# Patient Record
Sex: MALE | Race: WHITE | NOT HISPANIC OR LATINO | Employment: FULL TIME | URBAN - NONMETROPOLITAN AREA
[De-identification: names, ages, dates, MRNs, and addresses within clinical notes are randomized per-mention and may not be internally consistent; named-entity substitution may affect disease eponyms.]

---

## 2020-09-26 ENCOUNTER — HOSPITAL ENCOUNTER (EMERGENCY)
Facility: HOSPITAL | Age: 45
Discharge: ANOTHER HOSPITAL | End: 2020-09-26
Attending: EMERGENCY MEDICINE | Admitting: EMERGENCY MEDICINE
Payer: COMMERCIAL

## 2020-09-26 VITALS
SYSTOLIC BLOOD PRESSURE: 104 MMHG | DIASTOLIC BLOOD PRESSURE: 57 MMHG | OXYGEN SATURATION: 98 % | WEIGHT: 165 LBS | HEART RATE: 77 BPM | HEIGHT: 68 IN | RESPIRATION RATE: 14 BRPM | TEMPERATURE: 97.4 F | BODY MASS INDEX: 25.01 KG/M2

## 2020-09-26 DIAGNOSIS — K92.2 UPPER GI BLEED: Primary | ICD-10-CM

## 2020-09-26 LAB
ABO GROUP BLD: NORMAL
ABO GROUP BLD: NORMAL
ALBUMIN SERPL BCP-MCNC: 3.4 G/DL (ref 3.5–5)
ALP SERPL-CCNC: 75 U/L (ref 46–116)
ALT SERPL W P-5'-P-CCNC: 37 U/L (ref 12–78)
ANION GAP SERPL CALCULATED.3IONS-SCNC: 5 MMOL/L (ref 4–13)
AST SERPL W P-5'-P-CCNC: 13 U/L (ref 5–45)
BASOPHILS # BLD AUTO: 0.04 THOUSANDS/ΜL (ref 0–0.1)
BASOPHILS NFR BLD AUTO: 0 % (ref 0–1)
BILIRUB SERPL-MCNC: 0.6 MG/DL (ref 0.2–1)
BLD GP AB SCN SERPL QL: POSITIVE
BUN SERPL-MCNC: 41 MG/DL (ref 5–25)
CALCIUM ALBUM COR SERPL-MCNC: 9 MG/DL (ref 8.3–10.1)
CALCIUM SERPL-MCNC: 8.5 MG/DL (ref 8.3–10.1)
CHLORIDE SERPL-SCNC: 103 MMOL/L (ref 100–108)
CO2 SERPL-SCNC: 30 MMOL/L (ref 21–32)
CREAT SERPL-MCNC: 0.86 MG/DL (ref 0.6–1.3)
EOSINOPHIL # BLD AUTO: 0.04 THOUSAND/ΜL (ref 0–0.61)
EOSINOPHIL NFR BLD AUTO: 0 % (ref 0–6)
ERYTHROCYTE [DISTWIDTH] IN BLOOD BY AUTOMATED COUNT: 12.8 % (ref 11.6–15.1)
GFR SERPL CREATININE-BSD FRML MDRD: 105 ML/MIN/1.73SQ M
GLUCOSE SERPL-MCNC: 190 MG/DL (ref 65–140)
HCT VFR BLD AUTO: 36.1 % (ref 36.5–49.3)
HGB BLD-MCNC: 12.1 G/DL (ref 12–17)
IMM GRANULOCYTES # BLD AUTO: 0.09 THOUSAND/UL (ref 0–0.2)
IMM GRANULOCYTES NFR BLD AUTO: 1 % (ref 0–2)
LIPASE SERPL-CCNC: 95 U/L (ref 73–393)
LYMPHOCYTES # BLD AUTO: 2.23 THOUSANDS/ΜL (ref 0.6–4.47)
LYMPHOCYTES NFR BLD AUTO: 18 % (ref 14–44)
MCH RBC QN AUTO: 30.8 PG (ref 26.8–34.3)
MCHC RBC AUTO-ENTMCNC: 33.5 G/DL (ref 31.4–37.4)
MCV RBC AUTO: 92 FL (ref 82–98)
MONOCYTES # BLD AUTO: 0.79 THOUSAND/ΜL (ref 0.17–1.22)
MONOCYTES NFR BLD AUTO: 6 % (ref 4–12)
NEUTROPHILS # BLD AUTO: 9.58 THOUSANDS/ΜL (ref 1.85–7.62)
NEUTS SEG NFR BLD AUTO: 75 % (ref 43–75)
NRBC BLD AUTO-RTO: 0 /100 WBCS
PLATELET # BLD AUTO: 320 THOUSANDS/UL (ref 149–390)
PMV BLD AUTO: 10.7 FL (ref 8.9–12.7)
POTASSIUM SERPL-SCNC: 4.2 MMOL/L (ref 3.5–5.3)
PROT SERPL-MCNC: 6.7 G/DL (ref 6.4–8.2)
RBC # BLD AUTO: 3.93 MILLION/UL (ref 3.88–5.62)
RH BLD: POSITIVE
RH BLD: POSITIVE
SODIUM SERPL-SCNC: 138 MMOL/L (ref 136–145)
SPECIMEN EXPIRATION DATE: NORMAL
TROPONIN I SERPL-MCNC: <0.02 NG/ML
WBC # BLD AUTO: 12.77 THOUSAND/UL (ref 4.31–10.16)

## 2020-09-26 PROCEDURE — 84484 ASSAY OF TROPONIN QUANT: CPT | Performed by: EMERGENCY MEDICINE

## 2020-09-26 PROCEDURE — 80053 COMPREHEN METABOLIC PANEL: CPT | Performed by: EMERGENCY MEDICINE

## 2020-09-26 PROCEDURE — 83690 ASSAY OF LIPASE: CPT | Performed by: EMERGENCY MEDICINE

## 2020-09-26 PROCEDURE — 86901 BLOOD TYPING SEROLOGIC RH(D): CPT | Performed by: EMERGENCY MEDICINE

## 2020-09-26 PROCEDURE — 86905 BLOOD TYPING RBC ANTIGENS: CPT

## 2020-09-26 PROCEDURE — 96365 THER/PROPH/DIAG IV INF INIT: CPT

## 2020-09-26 PROCEDURE — 96366 THER/PROPH/DIAG IV INF ADDON: CPT

## 2020-09-26 PROCEDURE — 85025 COMPLETE CBC W/AUTO DIFF WBC: CPT | Performed by: EMERGENCY MEDICINE

## 2020-09-26 PROCEDURE — 86870 RBC ANTIBODY IDENTIFICATION: CPT | Performed by: EMERGENCY MEDICINE

## 2020-09-26 PROCEDURE — 36415 COLL VENOUS BLD VENIPUNCTURE: CPT | Performed by: EMERGENCY MEDICINE

## 2020-09-26 PROCEDURE — 86900 BLOOD TYPING SEROLOGIC ABO: CPT | Performed by: EMERGENCY MEDICINE

## 2020-09-26 PROCEDURE — 86850 RBC ANTIBODY SCREEN: CPT | Performed by: EMERGENCY MEDICINE

## 2020-09-26 PROCEDURE — 99285 EMERGENCY DEPT VISIT HI MDM: CPT | Performed by: EMERGENCY MEDICINE

## 2020-09-26 PROCEDURE — 82272 OCCULT BLD FECES 1-3 TESTS: CPT

## 2020-09-26 PROCEDURE — 99285 EMERGENCY DEPT VISIT HI MDM: CPT

## 2020-09-26 PROCEDURE — C9113 INJ PANTOPRAZOLE SODIUM, VIA: HCPCS | Performed by: EMERGENCY MEDICINE

## 2020-09-26 RX ADMIN — SODIUM CHLORIDE 80 MG: 9 INJECTION, SOLUTION INTRAVENOUS at 07:52

## 2020-09-26 RX ADMIN — SODIUM CHLORIDE 8 MG/HR: 9 INJECTION, SOLUTION INTRAVENOUS at 07:50

## 2020-09-26 RX ADMIN — SODIUM CHLORIDE 1000 ML: 0.9 INJECTION, SOLUTION INTRAVENOUS at 07:50

## 2020-09-26 NOTE — ED PROVIDER NOTES
History  Chief Complaint   Patient presents with    Black or Bloody Stool     dark black stool     Vomiting     pt with blood in toilet this morning from vomiting     Patient complains of GI bleed since last night  He states he had an episode of bright red blood vomiting  He states he has had black stools since that time  He denies abdominal pain  He denies heavy alcohol use  No other complaints  History provided by:  Patient   used: No    Black or Bloody Stool   Quality:  Black and tarry  Amount: Moderate  Duration:  1 day  Timing:  Constant  Chronicity:  New  Context: spontaneously    Context: not constipation and not diarrhea    Similar prior episodes: no    Relieved by:  Nothing  Worsened by:  Nothing  Ineffective treatments:  None tried  Associated symptoms: hematemesis and vomiting    Associated symptoms: no abdominal pain, no dizziness, no epistaxis, no fever, no light-headedness, no loss of consciousness and no recent illness    Risk factors: no anticoagulant use    Vomiting   Associated symptoms: no abdominal pain, no chills, no cough, no diarrhea, no fever, no headaches and no sore throat        None       History reviewed  No pertinent past medical history  History reviewed  No pertinent surgical history  History reviewed  No pertinent family history  I have reviewed and agree with the history as documented  E-Cigarette/Vaping    E-Cigarette Use Current Every Day User      E-Cigarette/Vaping Substances    Nicotine Yes     Flavoring Yes      Social History     Tobacco Use    Smoking status: Never Smoker    Smokeless tobacco: Never Used   Substance Use Topics    Alcohol use: Not Currently    Drug use: Not Currently       Review of Systems   Constitutional: Negative for chills and fever  HENT: Negative for ear pain, hearing loss, nosebleeds, sore throat, trouble swallowing and voice change  Eyes: Negative for pain and discharge     Respiratory: Negative for cough, shortness of breath and wheezing  Cardiovascular: Negative for chest pain and palpitations  Gastrointestinal: Positive for blood in stool, hematemesis and vomiting  Negative for abdominal pain, constipation, diarrhea and nausea  Genitourinary: Negative for dysuria, flank pain, frequency and hematuria  Musculoskeletal: Negative for joint swelling, neck pain and neck stiffness  Skin: Negative for rash and wound  Neurological: Negative for dizziness, seizures, loss of consciousness, syncope, facial asymmetry, light-headedness and headaches  Psychiatric/Behavioral: Negative for hallucinations, self-injury and suicidal ideas  All other systems reviewed and are negative  Physical Exam  Physical Exam  Vitals signs and nursing note reviewed  Constitutional:       General: He is not in acute distress  Appearance: He is well-developed  HENT:      Head: Normocephalic and atraumatic  Right Ear: External ear normal       Left Ear: External ear normal    Eyes:      Conjunctiva/sclera: Conjunctivae normal       Pupils: Pupils are equal, round, and reactive to light  Neck:      Musculoskeletal: Normal range of motion and neck supple  Cardiovascular:      Rate and Rhythm: Normal rate and regular rhythm  Heart sounds: Normal heart sounds  No murmur  Pulmonary:      Effort: Pulmonary effort is normal       Breath sounds: Normal breath sounds  No wheezing or rales  Abdominal:      General: Bowel sounds are normal  There is no distension  Palpations: Abdomen is soft  Tenderness: There is no abdominal tenderness  There is no guarding or rebound  Genitourinary:     Prostate: Normal       Rectum: Guaiac result positive  Musculoskeletal: Normal range of motion  General: No deformity  Skin:     General: Skin is warm and dry  Findings: No rash  Neurological:      General: No focal deficit present        Mental Status: He is alert and oriented to person, place, and time  Cranial Nerves: No cranial nerve deficit     Psychiatric:         Behavior: Behavior normal          Vital Signs  ED Triage Vitals [09/26/20 0721]   Temperature Pulse Respirations Blood Pressure SpO2   (!) 97 4 °F (36 3 °C) 83 16 109/68 100 %      Temp Source Heart Rate Source Patient Position - Orthostatic VS BP Location FiO2 (%)   Temporal Monitor -- Left arm --      Pain Score       --           Vitals:    09/26/20 0930 09/26/20 1000 09/26/20 1030 09/26/20 1100   BP: 90/60 95/58 95/55 91/55   Pulse: 75 75 75 74         Visual Acuity      ED Medications  Medications   pantoprazole (PROTONIX) 80 mg in sodium chloride 0 9 % 100 mL infusion (8 mg/hr Intravenous New Bag 9/26/20 0750)   sodium chloride 0 9 % bolus 1,000 mL (1,000 mL Intravenous New Bag 9/26/20 0750)   pantoprazole (PROTONIX) 80 mg in sodium chloride 0 9 % 100 mL IVPB (0 mg Intravenous Stopped 9/26/20 0814)       Diagnostic Studies  Results Reviewed     Procedure Component Value Units Date/Time    Troponin I [[id number]]  (Normal) Collected:  09/26/20 0750    Lab Status:  Final result Specimen:  Blood from Arm, Right Updated:  09/26/20 0820     Troponin I <0 02 ng/mL     Lipase [[id number]]  (Normal) Collected:  09/26/20 0749    Lab Status:  Final result Specimen:  Blood from Arm, Right Updated:  09/26/20 0813     Lipase 95 u/L     Comprehensive metabolic panel [[id number]]  (Abnormal) Collected:  09/26/20 0749    Lab Status:  Final result Specimen:  Blood from Arm, Right Updated:  09/26/20 0813     Sodium 138 mmol/L      Potassium 4 2 mmol/L      Chloride 103 mmol/L      CO2 30 mmol/L      ANION GAP 5 mmol/L      BUN 41 mg/dL      Creatinine 0 86 mg/dL      Glucose 190 mg/dL      Calcium 8 5 mg/dL      Corrected Calcium 9 0 mg/dL      AST 13 U/L      ALT 37 U/L      Alkaline Phosphatase 75 U/L      Total Protein 6 7 g/dL      Albumin 3 4 g/dL      Total Bilirubin 0 60 mg/dL      eGFR 105 ml/min/1 73sq m     Narrative:       [employer] guidelines for Chronic Kidney Disease (CKD):     Stage 1 with normal or high GFR (GFR > 90 mL/min/1 73 square meters)    Stage 2 Mild CKD (GFR = 60-89 mL/min/1 73 square meters)    Stage 3A Moderate CKD (GFR = 45-59 mL/min/1 73 square meters)    Stage 3B Moderate CKD (GFR = 30-44 mL/min/1 73 square meters)    Stage 4 Severe CKD (GFR = 15-29 mL/min/1 73 square meters)    Stage 5 End Stage CKD (GFR <15 mL/min/1 73 square meters)  Note: GFR calculation is accurate only with a steady state creatinine    CBC and differential [[id number]]  (Abnormal) Collected:  09/26/20 0749    Lab Status:  Final result Specimen:  Blood from Arm, Right Updated:  09/26/20 0801     WBC 12 77 Thousand/uL      RBC 3 93 Million/uL      Hemoglobin 12 1 g/dL      Hematocrit 36 1 %      MCV 92 fL      MCH 30 8 pg      MCHC 33 5 g/dL      RDW 12 8 %      MPV 10 7 fL      Platelets 605 Thousands/uL      nRBC 0 /100 WBCs      Neutrophils Relative 75 %      Immat GRANS % 1 %      Lymphocytes Relative 18 %      Monocytes Relative 6 %      Eosinophils Relative 0 %      Basophils Relative 0 %      Neutrophils Absolute 9 58 Thousands/µL      Immature Grans Absolute 0 09 Thousand/uL      Lymphocytes Absolute 2 23 Thousands/µL      Monocytes Absolute 0 79 Thousand/µL      Eosinophils Absolute 0 04 Thousand/µL      Basophils Absolute 0 04 Thousands/µL                  No orders to display              Procedures  Procedures         ED Course  ED Course as of Sep 26 1118   Sat Sep 26, 2020   0954 Patient with upper GI bleed, will require transfer as there is no GI availability here  Patient requests transfer to [facility]  Call has been placed to the transfer center and they are working on finding an accepting physician    Patient remains stable at this time, currently on Protonix drip      1115 Accepted at [facility] by Dr Wyatt Cole, awaiting bed availability                              SBIRT 20yo+      Most Recent Value   SBIRT (24 yo +)   In order to provide better care to our patients, we are screening all of our patients for alcohol and drug use  Would it be okay to ask you these screening questions? Yes Filed at: 09/26/2020 0725   Initial Alcohol Screen: US AUDIT-C    1  How often do you have a drink containing alcohol?  0 Filed at: 09/26/2020 0725   2  How many drinks containing alcohol do you have on a typical day you are drinking? 0 Filed at: 09/26/2020 0725   3a  Male UNDER 65: How often do you have five or more drinks on one occasion? 0 Filed at: 09/26/2020 0725   3b  FEMALE Any Age, or MALE 65+: How often do you have 4 or more drinks on one occassion? 0 Filed at: 09/26/2020 0725   Audit-C Score  0 Filed at: 09/26/2020 0725   ANATOLIY: How many times in the past year have you    Used an illegal drug or used a prescription medication for non-medical reasons? Never Filed at: 09/26/2020 0725                  MDM  Number of Diagnoses or Management Options     Amount and/or Complexity of Data Reviewed  Clinical lab tests: ordered and reviewed  Tests in the radiology section of CPT®: ordered and reviewed  Decide to obtain previous medical records or to obtain history from someone other than the patient: yes  Review and summarize past medical records: yes  Independent visualization of images, tracings, or specimens: yes        Disposition  Final diagnoses:   Upper GI bleed     Time reflects when diagnosis was documented in both MDM as applicable and the Disposition within this note     Time User Action Codes Description Comment    9/26/2020 11:16 AM Deborah Avila Add [K92 2] Upper GI bleed       ED Disposition     ED Disposition Condition Date/Time Comment    Transfer to Another [facility] Sep 26, 2020 11:16 AM Clifford Chris should be transferred out to [facility]          MD Documentation      Most Recent Value   Patient Condition  The patient has been stabilized such that within reasonable medical probability, no material deterioration of the patient condition or the condition of the unborn child(teetee) is likely to result from the transfer   Reason for Transfer  Level of Care needed not available at this facility [GI]   Benefits of Transfer  Specialized equipment and/or services available at the receiving facility (Include comment)________________________   Risks of Transfer  Potential for delay in receiving treatment, Potential deterioration of medical condition, Loss of IV, Increased discomfort during transfer, Possible worsening of condition or death during transfer   Accepting Physician  Dr Trina Jung Name, Devin Owens   Sending MD Dr Ashley Weaver   Provider Certification  General risk, such as traffic hazards, adverse weather conditions, rough terrain or turbulence, possible failure of equipment (including vehicle or aircraft), or consequences of actions of persons outside the control of the transport personnel, Unanticipated needs of medical equipment and personnel during transport, Risk of worsening condition, The possibility of a transport vehicle being unavailable      RN Documentation      Most [address] [facility] Name, Devin Owens      Follow-up Information    None         Patient's Medications    No medications on file     No discharge procedures on file      PDMP Review     None          ED Provider  Electronically Signed by           Mally Bergman MD  09/26/20 6730

## 2020-09-26 NOTE — ED NOTES
Report to Moisés Cannon,  [facility] here for transport         Samantha Hernandez RN  09/26/20 3034

## 2020-09-26 NOTE — ED NOTES
PACS contacted again for update regarding pending transport  Reporting still [facility] remains at "capacity", however, Dr Paty Riley was able to obtain accepting physician  Pt updated on wait and offering no c/o         Donavon Stewart RN  09/26/20 1668

## 2020-09-26 NOTE — EMTALA/ACUTE CARE TRANSFER
[address]  [facility] [address]  Dept: [phone]      EMTALA TRANSFER CONSENT    NAME Yola MOLINA 1975                              MRN [id number]    I have been informed of my rights regarding examination, treatment, and transfer   by Dr Kailyn Barraza MD    Benefits: Specialized equipment and/or services available at the receiving facility (Include comment)________________________    Risks: Potential for delay in receiving treatment, Potential deterioration of medical condition, Loss of IV, Increased discomfort during transfer, Possible worsening of condition or death during transfer      Consent for Transfer:  I acknowledge that my medical condition has been evaluated and explained to me by the emergency department physician or other qualified medical person and/or my attending physician, who has recommended that I be transferred to the service of  Accepting Physician: Dr Kimber Luna at [facility] Name, [address] : [facility]  The above potential benefits of such transfer, the potential risks associated with such transfer, and the probable risks of not being transferred have been explained to me, and I fully understand them  The doctor has explained that, in my case, the benefits of transfer outweigh the risks  I agree to be transferred  I authorize the performance of emergency medical procedures and treatments upon me in both transit and upon arrival at the receiving facility  Additionally, I authorize the release of any and all medical records to the receiving facility and request they be transported with me, if possible  I understand that the safest mode of transportation during a medical emergency is an ambulance and that the Hospital advocates the use of this mode of transport   Risks of traveling to the receiving facility by car, including absence of medical control, life sustaining equipment, such as oxygen, and medical personnel has been explained to me and I fully understand them  (SUHAIL CORRECT BOX BELOW)  [X]  I consent to the stated transfer and to be transported by ambulance/helicopter  [  ]  I consent to the stated transfer, but refuse transportation by ambulance and accept full responsibility for my transportation by car  I understand the risks of non-ambulance transfers and I exonerate the Hospital and its staff from any deterioration in my condition that results from this refusal     X___________________________________________    DATE  20  TIME________  Signature of patient or legally responsible individual signing on patient behalf           RELATIONSHIP TO PATIENT_________________________          Provider Certification    NAME Sarah Herrera                                        [facility] 1975                              MRN [id number]    A medical screening exam was performed on the above named patient  Based on the examination:    Condition Necessitating Transfer The encounter diagnosis was Upper GI bleed      Patient Condition: The patient has been stabilized such that within reasonable medical probability, no material deterioration of the patient condition or the condition of the unborn child(teetee) is likely to result from the transfer    Reason for Transfer: Level of Care needed not available at this facility(GI)    Transfer Requirements: [facility] [address]   · Space available and qualified personnel available for treatment as acknowledged by    · Agreed to accept transfer and to provide appropriate medical treatment as acknowledged by       Dr Sangita Álvarez  · Appropriate medical records of the examination and treatment of the patient are provided at the time of transfer   [address] _______  · Transfer will be performed by qualified personnel from    and appropriate transfer equipment as required, including the use of necessary and appropriate life support measures  Provider Certification: I have examined the patient and explained the following risks and benefits of being transferred/refusing transfer to the patient/family:  General risk, such as traffic hazards, adverse weather conditions, rough terrain or turbulence, possible failure of equipment (including vehicle or aircraft), or consequences of actions of persons outside the control of the transport personnel, Unanticipated needs of medical equipment and personnel during transport, Risk of worsening condition, The possibility of a transport vehicle being unavailable      Based on these reasonable risks and benefits to the patient and/or the unborn child(teetee), and based upon the information available at the time of the patients examination, I certify that the medical benefits reasonably to be expected from the provision of appropriate medical treatments at another medical facility outweigh the increasing risks, if any, to the individuals medical condition, and in the case of labor to the unborn child, from effecting the transfer      X____________________________________________ DATE 09/26/20        TIME_______      ORIGINAL - SEND TO MEDICAL RECORDS   COPY - SEND WITH PATIENT DURING TRANSFER

## 2020-09-28 ENCOUNTER — HOSPITAL ENCOUNTER (OUTPATIENT)
Dept: GASTROENTEROLOGY | Facility: HOSPITAL | Age: 45
Setting detail: OUTPATIENT SURGERY
Discharge: HOME | End: 2020-09-28
Attending: INTERNAL MEDICINE

## 2020-09-28 LAB
BLOOD GROUP ANTIBODIES SERPL: NORMAL
E AG RBC QL: NEGATIVE
LITTLE C AG RBC QL: NEGATIVE

## 2023-12-30 ENCOUNTER — APPOINTMENT (INPATIENT)
Dept: CT IMAGING | Facility: HOSPITAL | Age: 48
DRG: 100 | End: 2023-12-30
Payer: COMMERCIAL

## 2023-12-30 ENCOUNTER — HOSPITAL ENCOUNTER (INPATIENT)
Facility: HOSPITAL | Age: 48
LOS: 1 days | Discharge: HOME | DRG: 100 | End: 2023-12-31
Attending: EMERGENCY MEDICINE | Admitting: FAMILY MEDICINE
Payer: COMMERCIAL

## 2023-12-30 ENCOUNTER — APPOINTMENT (EMERGENCY)
Dept: RADIOLOGY | Facility: HOSPITAL | Age: 48
DRG: 100 | End: 2023-12-30
Payer: COMMERCIAL

## 2023-12-30 DIAGNOSIS — R56.9 SEIZURE-LIKE ACTIVITY (HCC): Primary | ICD-10-CM

## 2023-12-30 DIAGNOSIS — U07.1 COVID: ICD-10-CM

## 2023-12-30 PROBLEM — R74.01 TRANSAMINITIS: Status: ACTIVE | Noted: 2023-12-30

## 2023-12-30 LAB
ALBUMIN SERPL BCP-MCNC: 4.5 G/DL (ref 3.5–5)
ALP SERPL-CCNC: 94 U/L (ref 34–104)
ALT SERPL W P-5'-P-CCNC: 71 U/L (ref 7–52)
ANION GAP SERPL CALCULATED.3IONS-SCNC: 11 MMOL/L
AST SERPL W P-5'-P-CCNC: 45 U/L (ref 13–39)
BASOPHILS # BLD AUTO: 0.02 THOUSANDS/ÂΜL (ref 0–0.1)
BASOPHILS NFR BLD AUTO: 0 % (ref 0–1)
BILIRUB SERPL-MCNC: 1.24 MG/DL (ref 0.2–1)
BUN SERPL-MCNC: 13 MG/DL (ref 5–25)
CALCIUM SERPL-MCNC: 9.2 MG/DL (ref 8.4–10.2)
CHLORIDE SERPL-SCNC: 101 MMOL/L (ref 96–108)
CO2 SERPL-SCNC: 24 MMOL/L (ref 21–32)
CREAT SERPL-MCNC: 1 MG/DL (ref 0.6–1.3)
EOSINOPHIL # BLD AUTO: 0 THOUSAND/ÂΜL (ref 0–0.61)
EOSINOPHIL NFR BLD AUTO: 0 % (ref 0–6)
ERYTHROCYTE [DISTWIDTH] IN BLOOD BY AUTOMATED COUNT: 13.3 % (ref 11.6–15.1)
FLUAV RNA RESP QL NAA+PROBE: NEGATIVE
FLUBV RNA RESP QL NAA+PROBE: NEGATIVE
GFR SERPL CREATININE-BSD FRML MDRD: 87 ML/MIN/1.73SQ M
GLUCOSE SERPL-MCNC: 133 MG/DL (ref 65–140)
HCT VFR BLD AUTO: 46.9 % (ref 36.5–49.3)
HGB BLD-MCNC: 15.3 G/DL (ref 12–17)
IMM GRANULOCYTES # BLD AUTO: 0.02 THOUSAND/UL (ref 0–0.2)
IMM GRANULOCYTES NFR BLD AUTO: 0 % (ref 0–2)
LACTATE SERPL-SCNC: 2.1 MMOL/L (ref 0.5–2)
LYMPHOCYTES # BLD AUTO: 0.61 THOUSANDS/ÂΜL (ref 0.6–4.47)
LYMPHOCYTES NFR BLD AUTO: 10 % (ref 14–44)
MCH RBC QN AUTO: 29.1 PG (ref 26.8–34.3)
MCHC RBC AUTO-ENTMCNC: 32.6 G/DL (ref 31.4–37.4)
MCV RBC AUTO: 89 FL (ref 82–98)
MONOCYTES # BLD AUTO: 0.86 THOUSAND/ÂΜL (ref 0.17–1.22)
MONOCYTES NFR BLD AUTO: 15 % (ref 4–12)
NEUTROPHILS # BLD AUTO: 4.4 THOUSANDS/ÂΜL (ref 1.85–7.62)
NEUTS SEG NFR BLD AUTO: 75 % (ref 43–75)
NRBC BLD AUTO-RTO: 0 /100 WBCS
PLATELET # BLD AUTO: 247 THOUSANDS/UL (ref 149–390)
PMV BLD AUTO: 10.1 FL (ref 8.9–12.7)
POTASSIUM SERPL-SCNC: 3.8 MMOL/L (ref 3.5–5.3)
PROT SERPL-MCNC: 7.5 G/DL (ref 6.4–8.4)
RBC # BLD AUTO: 5.26 MILLION/UL (ref 3.88–5.62)
RSV RNA RESP QL NAA+PROBE: NEGATIVE
SARS-COV-2 RNA RESP QL NAA+PROBE: POSITIVE
SODIUM SERPL-SCNC: 136 MMOL/L (ref 135–147)
WBC # BLD AUTO: 5.91 THOUSAND/UL (ref 4.31–10.16)

## 2023-12-30 PROCEDURE — 0241U HB NFCT DS VIR RESP RNA 4 TRGT: CPT | Performed by: EMERGENCY MEDICINE

## 2023-12-30 PROCEDURE — 93005 ELECTROCARDIOGRAM TRACING: CPT

## 2023-12-30 PROCEDURE — 80053 COMPREHEN METABOLIC PANEL: CPT | Performed by: EMERGENCY MEDICINE

## 2023-12-30 PROCEDURE — G1004 CDSM NDSC: HCPCS

## 2023-12-30 PROCEDURE — 96361 HYDRATE IV INFUSION ADD-ON: CPT

## 2023-12-30 PROCEDURE — 96374 THER/PROPH/DIAG INJ IV PUSH: CPT

## 2023-12-30 PROCEDURE — 85025 COMPLETE CBC W/AUTO DIFF WBC: CPT | Performed by: EMERGENCY MEDICINE

## 2023-12-30 PROCEDURE — 99285 EMERGENCY DEPT VISIT HI MDM: CPT

## 2023-12-30 PROCEDURE — 99223 1ST HOSP IP/OBS HIGH 75: CPT | Performed by: FAMILY MEDICINE

## 2023-12-30 PROCEDURE — 96375 TX/PRO/DX INJ NEW DRUG ADDON: CPT

## 2023-12-30 PROCEDURE — G0426 INPT/ED TELECONSULT50: HCPCS | Performed by: PSYCHIATRY & NEUROLOGY

## 2023-12-30 PROCEDURE — 83605 ASSAY OF LACTIC ACID: CPT | Performed by: PHYSICIAN ASSISTANT

## 2023-12-30 PROCEDURE — 36415 COLL VENOUS BLD VENIPUNCTURE: CPT | Performed by: EMERGENCY MEDICINE

## 2023-12-30 PROCEDURE — 71045 X-RAY EXAM CHEST 1 VIEW: CPT

## 2023-12-30 PROCEDURE — 99285 EMERGENCY DEPT VISIT HI MDM: CPT | Performed by: EMERGENCY MEDICINE

## 2023-12-30 PROCEDURE — 70450 CT HEAD/BRAIN W/O DYE: CPT

## 2023-12-30 RX ORDER — ONDANSETRON 2 MG/ML
4 INJECTION INTRAMUSCULAR; INTRAVENOUS ONCE
Status: COMPLETED | OUTPATIENT
Start: 2023-12-30 | End: 2023-12-30

## 2023-12-30 RX ORDER — LEVETIRACETAM 500 MG/1
750 TABLET ORAL EVERY 12 HOURS SCHEDULED
Status: DISCONTINUED | OUTPATIENT
Start: 2023-12-30 | End: 2023-12-31 | Stop reason: HOSPADM

## 2023-12-30 RX ORDER — KETOROLAC TROMETHAMINE 30 MG/ML
15 INJECTION, SOLUTION INTRAMUSCULAR; INTRAVENOUS ONCE
Status: COMPLETED | OUTPATIENT
Start: 2023-12-30 | End: 2023-12-30

## 2023-12-30 RX ORDER — SODIUM CHLORIDE, SODIUM GLUCONATE, SODIUM ACETATE, POTASSIUM CHLORIDE, MAGNESIUM CHLORIDE, SODIUM PHOSPHATE, DIBASIC, AND POTASSIUM PHOSPHATE .53; .5; .37; .037; .03; .012; .00082 G/100ML; G/100ML; G/100ML; G/100ML; G/100ML; G/100ML; G/100ML
125 INJECTION, SOLUTION INTRAVENOUS CONTINUOUS
Status: DISCONTINUED | OUTPATIENT
Start: 2023-12-30 | End: 2023-12-31 | Stop reason: HOSPADM

## 2023-12-30 RX ORDER — LEVETIRACETAM 500 MG/1
1000 TABLET ORAL ONCE
Status: COMPLETED | OUTPATIENT
Start: 2023-12-30 | End: 2023-12-30

## 2023-12-30 RX ORDER — ACETAMINOPHEN 325 MG/1
650 TABLET ORAL ONCE
Status: COMPLETED | OUTPATIENT
Start: 2023-12-30 | End: 2023-12-30

## 2023-12-30 RX ORDER — ONDANSETRON 2 MG/ML
4 INJECTION INTRAMUSCULAR; INTRAVENOUS EVERY 6 HOURS PRN
Status: DISCONTINUED | OUTPATIENT
Start: 2023-12-30 | End: 2023-12-31 | Stop reason: HOSPADM

## 2023-12-30 RX ORDER — ACETAMINOPHEN 325 MG/1
650 TABLET ORAL EVERY 6 HOURS PRN
Status: DISCONTINUED | OUTPATIENT
Start: 2023-12-30 | End: 2023-12-31 | Stop reason: HOSPADM

## 2023-12-30 RX ADMIN — SODIUM CHLORIDE 1000 ML: 0.9 INJECTION, SOLUTION INTRAVENOUS at 15:39

## 2023-12-30 RX ADMIN — ACETAMINOPHEN 650 MG: 325 TABLET, FILM COATED ORAL at 16:20

## 2023-12-30 RX ADMIN — ACETAMINOPHEN 650 MG: 325 TABLET, FILM COATED ORAL at 22:09

## 2023-12-30 RX ADMIN — LEVETIRACETAM 1000 MG: 500 TABLET, FILM COATED ORAL at 17:55

## 2023-12-30 RX ADMIN — KETOROLAC TROMETHAMINE 15 MG: 30 INJECTION, SOLUTION INTRAMUSCULAR at 15:45

## 2023-12-30 RX ADMIN — SODIUM CHLORIDE, SODIUM GLUCONATE, SODIUM ACETATE, POTASSIUM CHLORIDE, MAGNESIUM CHLORIDE, SODIUM PHOSPHATE, DIBASIC, AND POTASSIUM PHOSPHATE 125 ML/HR: .53; .5; .37; .037; .03; .012; .00082 INJECTION, SOLUTION INTRAVENOUS at 17:55

## 2023-12-30 RX ADMIN — ONDANSETRON 4 MG: 2 INJECTION INTRAMUSCULAR; INTRAVENOUS at 15:45

## 2023-12-30 NOTE — ED NOTES
This Rn hearing screaming coming from waiting room at this time. Significant other standing upon patient screaming his name. Patient unable to response. Patient appears to be having a seizure lasting approx 30 seconds - 45 seconds as noticed by this RN. Patient awaking & remaining postictal.      Mattie Crane RN  12/30/23 3935

## 2023-12-30 NOTE — H&P
[facility]  H&P  Name: Esteban Johansen 48 y.o. male I MRN: [id number]  Unit/Bed#: Z1 H10 I Date of Admission: 12/30/2023   Date of Service: 12/30/2023 I Hospital Day: 0      Assessment/Plan   * New onset seizure (HCC)  Assessment & Plan  Currently 4 episodes since yesterday.  Previous history of having 2 episodes not investigated.  COVID-positive  Had a fever of 102 when had seizure  ER spoke to neurology loaded with Keppra, Keppra 750 mg p.o. twice daily  Will order CT of the brain  MRI seizure protocol EEG although patient does not want to stay for the studies of the have to be done on Tuesday  Electrolytes are normal  Ensure no temperature rise      Transaminitis  Assessment & Plan  Suspect covid related no abdominal tenderness    COVID  Assessment & Plan  Cxr reviewed no pna  On ra  Tylenol prn fevers           VTE Pharmacologic Prophylaxis: VTE Score: 1 Low Risk (Score 0-2) - Encourage Ambulation.  Code Status: Level 1 - Full Code   Discussion with family: Updated  (wife) at bedside.    Anticipated Length of Stay: Patient will be admitted on an inpatient basis with an anticipated length of stay of greater than 2 midnights secondary to new onset seizures.    Total Time Spent on Date of Encounter in care of patient: >45 mins. This time was spent on one or more of the following: performing physical exam; counseling and coordination of care; obtaining or reviewing history; documenting in the medical record; reviewing/ordering tests, medications or procedures; communicating with other healthcare professionals and discussing with patient's family/caregivers.    Chief Complaint: Seizure    History of Present Illness:  Esteban Johansen is a 48 y.o. male with a PMH of no significant past medical history who presents with being sick with a headache dry cough fevers on and off yesterday started to have a grand mal seizure with her eyes rolling back shaking and  stiffness lasting about 30 seconds but wife took his fever was 102 that he started vomiting again and was about to have a seizure and he had a seizure grand mal afterwards again when he started vomiting and today at the waiting room.  Denies any diarrhea abdominal pain or shortness of breath.  Had before when he was vomiting 2 episodes of same but never was investigated his mother has epilepsy.has facial injury    Review of Systems:  Review of Systems   Constitutional:  Negative for chills and fever.   HENT:  Negative for ear pain and sore throat.    Eyes:  Negative for pain and visual disturbance.   Respiratory:  Negative for cough and shortness of breath.    Cardiovascular:  Negative for chest pain and palpitations.   Gastrointestinal:  Positive for vomiting. Negative for abdominal pain.   Genitourinary:  Negative for dysuria and hematuria.   Musculoskeletal:  Negative for arthralgias and back pain.   Skin:  Negative for color change and rash.   Neurological:  Positive for seizures. Negative for syncope.   All other systems reviewed and are negative.      Past Medical and Surgical History:   History reviewed. No pertinent past medical history.    History reviewed. No pertinent surgical history.    Meds/Allergies:  Prior to Admission medications    Not on File     I have reviewed home medications with patient personally.    Allergies:   Allergies   Allergen Reactions    Morphine Hives     hives- fentanyl(tolerating);tammi.percocet       Social History:  Marital Status: Registered Domestic Partner   Substance Use History:   Social History     Substance and Sexual Activity   Alcohol Use Not Currently     Social History     Tobacco Use   Smoking Status Never   Smokeless Tobacco Never     Social History     Substance and Sexual Activity   Drug Use Not Currently       Family History:  History reviewed. No pertinent family history.    Physical Exam:     Vitals:   Blood Pressure: 131/83 (12/30/23 1530)  Pulse: 79 (12/30/23  "1530)  Temperature: 98.2 °F (36.8 °C) (12/30/23 1530)  Temp Source: Temporal (12/30/23 1530)  Respirations: 17 (12/30/23 1530)  Height: 5' 8\" (172.7 cm) (12/30/23 1343)  Weight - Scale: 77.1 kg (170 lb) (12/30/23 1343)  SpO2: 96 % (12/30/23 1530)    Physical Exam  Vitals and nursing note reviewed.   Constitutional:       General: He is not in acute distress.     Appearance: He is well-developed.   HENT:      Head: Normocephalic and atraumatic.   Eyes:      Conjunctiva/sclera: Conjunctivae normal.   Cardiovascular:      Rate and Rhythm: Normal rate and regular rhythm.      Heart sounds: No murmur heard.  Pulmonary:      Effort: Pulmonary effort is normal. No respiratory distress.      Breath sounds: Normal breath sounds. No wheezing or rales.   Abdominal:      General: There is no distension.      Palpations: Abdomen is soft.      Tenderness: There is no abdominal tenderness.   Musculoskeletal:         General: No swelling.      Cervical back: Neck supple.   Skin:     General: Skin is warm and dry.      Capillary Refill: Capillary refill takes less than 2 seconds.   Neurological:      General: No focal deficit present.      Mental Status: He is alert and oriented to person, place, and time.   Psychiatric:         Mood and Affect: Mood normal.          Additional Data:     Lab Results:  Results from last 7 days   Lab Units 12/30/23  1348   WBC Thousand/uL 5.91   HEMOGLOBIN g/dL 15.3   HEMATOCRIT % 46.9   PLATELETS Thousands/uL 247   NEUTROS PCT % 75   LYMPHS PCT % 10*   MONOS PCT % 15*   EOS PCT % 0     Results from last 7 days   Lab Units 12/30/23  1348   SODIUM mmol/L 136   POTASSIUM mmol/L 3.8   CHLORIDE mmol/L 101   CO2 mmol/L 24   BUN mg/dL 13   CREATININE mg/dL 1.00   ANION GAP mmol/L 11   CALCIUM mg/dL 9.2   ALBUMIN g/dL 4.5   TOTAL BILIRUBIN mg/dL 1.24*   ALK PHOS U/L 94   ALT U/L 71*   AST U/L 45*   GLUCOSE RANDOM mg/dL 133                 Results from last 7 days   Lab Units 12/30/23  1539   LACTIC ACID " mmol/L 2.1*       Lines/Drains:  Invasive Devices       Peripheral Intravenous Line  Duration             Peripheral IV 12/30/23 Proximal;Right;Ventral (anterior) Forearm <1 day                        Imaging: Reviewed radiology reports from this admission including: chest xray  XR chest 1 view portable    (Results Pending)   CT head wo contrast    (Results Pending)   MRI inpatient order    (Results Pending)       EKG and Other Studies Reviewed on Admission:   EKG: No EKG obtained.    ** Please Note: This note has been constructed using a voice recognition system. **

## 2023-12-30 NOTE — ED PROVIDER NOTES
"History  Chief Complaint   Patient presents with    Seizure - New Onset     Pt reports testing + covid, states last night had a seizure while vomiting - family at bedside stating it was a grand mal seizure lasting approx 30 seconds - this AM had another seizure; each time seizure is occurring patient is vomiting; had similar episodes in the past     48-year-old scented to the emergency department for evaluation of seizures.  Per spouse who provides majority of the history patient had a seizure last night around 1230.  States that she had checked his temperature at this time which was 102 °F and also checked a rapid COVID test which returned positive.  States patient had been feeling unwell prior to this.  States patient had another seizure this morning followed by a third seizure while in the waiting room today.  Spouse states patient experienced full body shaking and states she is unable to get his attention at all when this is happening.  States he then \"wakes up and says what happened what happened.\"  He denies any loss of bowel or bladder control.  There is no tongue biting.  Spouse states patient typically foams at the mouth and his eyes rolled back.  Ports episodes have not lasted longer than 1 minute.  Per patient he has had a previous similar episode in the past also related to an illness.  States his mother has seizures and therefore he is suspected this has been a seizure in the past but he has never been fully evaluated for this and has never seen neurology.  Patient admits to nausea and vomiting and states he believes seizures are triggered by vomiting and high fevers.  Patient currently complaining of headache and bodyaches.  States he feels overall unwell.  He denies any visual changes, numbness, paresthesias.         None       History reviewed. No pertinent past medical history.    History reviewed. No pertinent surgical history.    History reviewed. No pertinent family history.  I have reviewed and " agree with the history as documented.    E-Cigarette/Vaping    E-Cigarette Use Current Every Day User      E-Cigarette/Vaping Substances    Nicotine Yes     Flavoring Yes      Social History     Tobacco Use    Smoking status: Never    Smokeless tobacco: Never   Vaping Use    Vaping status: Every Day    Substances: Nicotine, Flavoring   Substance Use Topics    Alcohol use: Not Currently    Drug use: Not Currently       Review of Systems   Constitutional:  Positive for appetite change, chills, fatigue and fever.   HENT:  Positive for congestion.    Eyes:  Negative for visual disturbance.   Respiratory:  Positive for cough.    Cardiovascular:  Negative for chest pain, palpitations and leg swelling.   Gastrointestinal:  Positive for nausea and vomiting. Negative for abdominal pain.   Musculoskeletal: Negative.    Skin: Negative.    Neurological:  Positive for headaches. Negative for dizziness.   All other systems reviewed and are negative.      Physical Exam  Physical Exam  Vitals and nursing note reviewed.   Constitutional:       General: He is not in acute distress.     Appearance: Normal appearance. He is not ill-appearing, toxic-appearing or diaphoretic.   HENT:      Head: Normocephalic and atraumatic.      Nose: Nose normal.      Mouth/Throat:      Mouth: Mucous membranes are moist.      Pharynx: Oropharynx is clear. No oropharyngeal exudate or posterior oropharyngeal erythema.   Eyes:      Extraocular Movements: Extraocular movements intact.      Conjunctiva/sclera: Conjunctivae normal.      Pupils: Pupils are equal, round, and reactive to light.      Comments: No nystagmus    Cardiovascular:      Rate and Rhythm: Normal rate.   Pulmonary:      Effort: Pulmonary effort is normal.      Breath sounds: Normal breath sounds. No stridor. No wheezing, rhonchi or rales.   Abdominal:      General: Abdomen is flat. Bowel sounds are normal. There is no distension.      Palpations: Abdomen is soft.      Tenderness: There is  no abdominal tenderness. There is no guarding.   Musculoskeletal:         General: Normal range of motion.      Cervical back: Normal range of motion and neck supple.   Skin:     General: Skin is warm and dry.      Findings: No rash.   Neurological:      General: No focal deficit present.      Mental Status: He is alert and oriented to person, place, and time.      GCS: GCS eye subscore is 4. GCS verbal subscore is 5. GCS motor subscore is 6.      Cranial Nerves: Cranial nerves 2-12 are intact. No facial asymmetry.      Sensory: Sensation is intact.      Motor: Motor function is intact.   Psychiatric:         Mood and Affect: Mood normal.         Vital Signs  ED Triage Vitals [12/30/23 1343]   Temperature Pulse Respirations Blood Pressure SpO2   98 °F (36.7 °C) 88 18 121/83 98 %      Temp Source Heart Rate Source Patient Position - Orthostatic VS BP Location FiO2 (%)   Temporal Monitor Sitting Right arm --      Pain Score       No Pain           Vitals:    12/30/23 1343 12/30/23 1530 12/30/23 1755   BP: 121/83 131/83 109/74   Pulse: 88 79 75   Patient Position - Orthostatic VS: Sitting Lying Lying         Visual Acuity      ED Medications  Medications   multi-electrolyte (PLASMALYTE-A/ISOLYTE-S PH 7.4) IV solution (125 mL/hr Intravenous New Bag 12/30/23 1755)   acetaminophen (TYLENOL) tablet 650 mg (has no administration in time range)   ondansetron (ZOFRAN) injection 4 mg (has no administration in time range)   levETIRAcetam (KEPPRA) tablet 750 mg (has no administration in time range)   sodium chloride 0.9 % bolus 1,000 mL (0 mL Intravenous Stopped 12/30/23 1702)   ondansetron (ZOFRAN) injection 4 mg (4 mg Intravenous Given 12/30/23 1545)   ketorolac (TORADOL) injection 15 mg (15 mg Intravenous Given 12/30/23 1545)   acetaminophen (TYLENOL) tablet 650 mg (650 mg Oral Given 12/30/23 1620)   levETIRAcetam (KEPPRA) tablet 1,000 mg (1,000 mg Oral Given 12/30/23 1755)       Diagnostic Studies  Results Reviewed        Procedure Component Value Units Date/Time    Lactic acid, plasma (w/reflex if result > 2.0) [[id number]]  (Abnormal) Collected: 12/30/23 1539    Lab Status: Final result Specimen: Blood from Arm, Right Updated: 12/30/23 1621     LACTIC ACID 2.1 mmol/L     Narrative:      Result may be elevated if tourniquet was used during collection.    FLU/RSV/COVID - if FLU/RSV clinically relevant [[id number]]  (Abnormal) Collected: 12/30/23 1348    Lab Status: Final result Specimen: Nares from Nose Updated: 12/30/23 1439     SARS-CoV-2 Positive     INFLUENZA A PCR Negative     INFLUENZA B PCR Negative     RSV PCR Negative    Narrative:      FOR PEDIATRIC PATIENTS - copy/paste COVID Guidelines URL to browser: [email or web address]    SARS-CoV-2 assay is a Nucleic Acid Amplification assay intended for the  qualitative detection of nucleic acid from SARS-CoV-2 in nasopharyngeal  swabs. Results are for the presumptive identification of SARS-CoV-2 RNA.    Positive results are indicative of infection with SARS-CoV-2, the virus  causing COVID-19, but do not rule out bacterial infection or co-infection  with other viruses. Laboratories within the [place] and its  territories are required to report all positive results to the appropriate  public health authorities. Negative results do not preclude SARS-CoV-2  infection and should not be used as the sole basis for treatment or other  patient management decisions. Negative results must be combined with  clinical observations, patient history, and epidemiological information.  This test has not been FDA cleared or approved.    This test has been authorized by FDA under an Emergency Use Authorization  (EUA). This test is only authorized for the duration of time the  declaration that circumstances exist justifying the authorization of the  emergency use of an in vitro diagnostic tests for detection of SARS-CoV-2  virus and/or diagnosis  of COVID-19 infection under section 564(b)(1) of  the Act, 21 U.S.C. 360bbb-3(b)(1), unless the authorization is terminated  or revoked sooner. The test has been validated but independent review by FDA  and CLIA is pending.    Test performed using [employer] GeneXpert: This RT-PCR assay targets N2,  a region unique to SARS-CoV-2. A conserved region in the E-gene was chosen  for pan-Sarbecovirus detection which includes SARS-CoV-2.    According to CMS-2020-01-R, this platform meets the definition of high-throughput technology.    Comprehensive metabolic panel [[id number]]  (Abnormal) Collected: 12/30/23 1348    Lab Status: Final result Specimen: Blood from Arm, Left Updated: 12/30/23 1419     Sodium 136 mmol/L      Potassium 3.8 mmol/L      Chloride 101 mmol/L      CO2 24 mmol/L      ANION GAP 11 mmol/L      BUN 13 mg/dL      Creatinine 1.00 mg/dL      Glucose 133 mg/dL      Calcium 9.2 mg/dL      AST 45 U/L      ALT 71 U/L      Alkaline Phosphatase 94 U/L      Total Protein 7.5 g/dL      Albumin 4.5 g/dL      Total Bilirubin 1.24 mg/dL      eGFR 87 ml/min/1.73sq m     Narrative:      National Kidney Disease Foundation guidelines for Chronic Kidney Disease (CKD):     Stage 1 with normal or high GFR (GFR > 90 mL/min/1.73 square meters)    Stage 2 Mild CKD (GFR = 60-89 mL/min/1.73 square meters)    Stage 3A Moderate CKD (GFR = 45-59 mL/min/1.73 square meters)    Stage 3B Moderate CKD (GFR = 30-44 mL/min/1.73 square meters)    Stage 4 Severe CKD (GFR = 15-29 mL/min/1.73 square meters)    Stage 5 End Stage CKD (GFR <15 mL/min/1.73 square meters)  Note: GFR calculation is accurate only with a steady state creatinine    CBC and differential [[id number]]  (Abnormal) Collected: 12/30/23 1348    Lab Status: Final result Specimen: Blood from Arm, Left Updated: 12/30/23 1403     WBC 5.91 Thousand/uL      RBC 5.26 Million/uL      Hemoglobin 15.3 g/dL      Hematocrit 46.9 %      MCV 89 fL      MCH 29.1 pg      MCHC 32.6 g/dL      RDW  13.3 %      MPV 10.1 fL      Platelets 247 Thousands/uL      nRBC 0 /100 WBCs      Neutrophils Relative 75 %      Immat GRANS % 0 %      Lymphocytes Relative 10 %      Monocytes Relative 15 %      Eosinophils Relative 0 %      Basophils Relative 0 %      Neutrophils Absolute 4.40 Thousands/µL      Immature Grans Absolute 0.02 Thousand/uL      Lymphocytes Absolute 0.61 Thousands/µL      Monocytes Absolute 0.86 Thousand/µL      Eosinophils Absolute 0.00 Thousand/µL      Basophils Absolute 0.02 Thousands/µL                    XR chest 1 view portable    (Results Pending)   CT head wo contrast    (Results Pending)   MRI inpatient order    (Results Pending)              Procedures  Procedures  EKG reviewed by attending        ED Course  ED Course as of 12/30/23 1805   Sat Dec 30, 2023   1526 SARS-COV-2(!): Positive   1539 Slightly elevated LFTs likely due to COVID   1540 WBC: 5.91   1630 TT sent to Epileptologist   1653 Dr. Alcala (epileptologist) recommends admit for formal neuro consult. Does not recommend starting any medications at this time   1654 Patient is agreeable to this treatment plan. Will TT medicine for admission    1703 Patient accepted for admission. Will discuss with neuro for recommendation and consult    1802 Neurology recommended loading dose of Keppra and was 1000mg, on maintenance dose of 750 twice daily.  Recommended EEG, MRI telemetry, echo. This was relayed to the Admitting dr. BENDER 20yo+      Flowsheet Row Most Recent Value   Initial Alcohol Screen: US AUDIT-C     1. How often do you have a drink containing alcohol? 0 Filed at: 12/30/2023 1343   2. How many drinks containing alcohol do you have on a typical day you are drinking?  0 Filed at: 12/30/2023 1343   3a. Male UNDER 65: How often do you have five or more drinks on one occasion? 0 Filed at: 12/30/2023 1343   3b. FEMALE Any Age, or MALE 65+: How often do you have 4 or more drinks on one occassion? 0 Filed at: 12/30/2023  1343   Audit-C Score 0 Filed at: 12/30/2023 1343   ANATOLIY: How many times in the past year have you...    Used an illegal drug or used a prescription medication for non-medical reasons? Never Filed at: 12/30/2023 1343                      Medical Decision Making  48-year-old male presented to the emergency department for evaluation of seizure-like activity, 3 episodes since 12:30 AM.  Vitals and medical record reviewed.  Patient COVID-positive.  At risk for the following but not limited to seizures, pseudoseizures, vasovagal episodes.  Discussed with Epileptologist neurologist.  Ultimately admission was recommended will start on Keppra.  Patient was accepted to medicine service.    Problems Addressed:  COVID: acute illness or injury  Seizure-like activity (HCC): acute illness or injury    Amount and/or Complexity of Data Reviewed  Labs: ordered. Decision-making details documented in ED Course.  Radiology: ordered.  ECG/medicine tests: ordered.  Discussion of management or test interpretation with external provider(s): Epileptologist and neurology   Medicine for admission     Risk  OTC drugs.  Prescription drug management.  Decision regarding hospitalization.             Disposition  Final diagnoses:   Seizure-like activity (HCC)   COVID     Time reflects when diagnosis was documented in both MDM as applicable and the Disposition within this note       Time User Action Codes Description Comment    12/30/2023  5:03 PM Charisma Do Add [R56.9] Seizure-like activity (HCC)     12/30/2023  5:03 PM Charisma Do Add [U07.1] COVID           ED Disposition       ED Disposition   Admit    Condition   Stable    Date/Time   Sat Dec 30, 2023 1703    Comment   Case was discussed with Dr. San and the patient's admission status was agreed to be Admission Status: inpatient status to the service of Dr. San .               Follow-up Information    None         Patient's Medications    No medications on file       No discharge  procedures on file.    PDMP Review       None            ED Provider  Electronically Signed by             Charisma Do PA-C  12/30/23 8227

## 2023-12-30 NOTE — Clinical Note
Esteban Johansen was seen and treated in our emergency department on 12/30/2023.        No work until cleared by Family Doctor/Orthopedics        Diagnosis:     Esteban  .    He may return on this date: 01/05/2024         If you have any questions or concerns, please don't hesitate to call.      Vicky De Anda, DO    ______________________________           _______________          _______________  Hospital Representative                              Date                                Time

## 2023-12-30 NOTE — ED NOTES
Provider aware we are unable to do continuous cardiac monitoring d/t patient being in a obrien bed. Patient denies chest pain. HR in 80s on portable monitor.      Pratima Tay RN  12/30/23 9015

## 2023-12-30 NOTE — ASSESSMENT & PLAN NOTE
Currently 4 episodes since yesterday.  Previous history of having 2 episodes not investigated.  COVID-positive  Had a fever of 102 when had seizure  ER spoke to neurology loaded with Keppra, Keppra 750 mg p.o. twice daily  Will order CT of the brain  MRI seizure protocol EEG although patient does not want to stay for the studies of the have to be done on Tuesday  Electrolytes are normal  Ensure no temperature rise

## 2023-12-31 VITALS
OXYGEN SATURATION: 97 % | SYSTOLIC BLOOD PRESSURE: 104 MMHG | BODY MASS INDEX: 25.76 KG/M2 | WEIGHT: 170 LBS | HEART RATE: 80 BPM | DIASTOLIC BLOOD PRESSURE: 57 MMHG | HEIGHT: 68 IN | RESPIRATION RATE: 18 BRPM | TEMPERATURE: 97.9 F

## 2023-12-31 LAB
ALBUMIN SERPL BCP-MCNC: 3.8 G/DL (ref 3.5–5)
ALP SERPL-CCNC: 89 U/L (ref 34–104)
ALT SERPL W P-5'-P-CCNC: 68 U/L (ref 7–52)
ANION GAP SERPL CALCULATED.3IONS-SCNC: 6 MMOL/L
AST SERPL W P-5'-P-CCNC: 46 U/L (ref 13–39)
BILIRUB SERPL-MCNC: 0.68 MG/DL (ref 0.2–1)
BUN SERPL-MCNC: 10 MG/DL (ref 5–25)
CALCIUM SERPL-MCNC: 8.2 MG/DL (ref 8.4–10.2)
CHLORIDE SERPL-SCNC: 103 MMOL/L (ref 96–108)
CO2 SERPL-SCNC: 26 MMOL/L (ref 21–32)
CREAT SERPL-MCNC: 0.9 MG/DL (ref 0.6–1.3)
GFR SERPL CREATININE-BSD FRML MDRD: 99 ML/MIN/1.73SQ M
GLUCOSE SERPL-MCNC: 121 MG/DL (ref 65–140)
POTASSIUM SERPL-SCNC: 3.8 MMOL/L (ref 3.5–5.3)
PROT SERPL-MCNC: 6.3 G/DL (ref 6.4–8.4)
SODIUM SERPL-SCNC: 135 MMOL/L (ref 135–147)

## 2023-12-31 PROCEDURE — 99239 HOSP IP/OBS DSCHRG MGMT >30: CPT | Performed by: PHYSICIAN ASSISTANT

## 2023-12-31 PROCEDURE — 80053 COMPREHEN METABOLIC PANEL: CPT | Performed by: FAMILY MEDICINE

## 2023-12-31 PROCEDURE — 36415 COLL VENOUS BLD VENIPUNCTURE: CPT | Performed by: FAMILY MEDICINE

## 2023-12-31 RX ORDER — IBUPROFEN 600 MG/1
600 TABLET ORAL ONCE
Status: COMPLETED | OUTPATIENT
Start: 2023-12-31 | End: 2023-12-31

## 2023-12-31 RX ORDER — LEVETIRACETAM 750 MG/1
750 TABLET ORAL EVERY 12 HOURS SCHEDULED
Qty: 60 TABLET | Refills: 0 | Status: SHIPPED | OUTPATIENT
Start: 2023-12-31

## 2023-12-31 RX ADMIN — LEVETIRACETAM 750 MG: 500 TABLET, FILM COATED ORAL at 08:48

## 2023-12-31 RX ADMIN — ACETAMINOPHEN 650 MG: 325 TABLET, FILM COATED ORAL at 03:41

## 2023-12-31 RX ADMIN — SODIUM CHLORIDE, SODIUM GLUCONATE, SODIUM ACETATE, POTASSIUM CHLORIDE, MAGNESIUM CHLORIDE, SODIUM PHOSPHATE, DIBASIC, AND POTASSIUM PHOSPHATE 125 ML/HR: .53; .5; .37; .037; .03; .012; .00082 INJECTION, SOLUTION INTRAVENOUS at 06:21

## 2023-12-31 RX ADMIN — IBUPROFEN 600 MG: 600 TABLET ORAL at 08:49

## 2023-12-31 NOTE — DISCHARGE SUMMARY
[facility]  Discharge- Esteban Johansen 1975, 48 y.o. male MRN: [id number]  Unit/Bed#: ED 01 Encounter: [id number]  Primary Care Provider: No primary care provider on file.   Date and time admitted to hospital: 12/30/2023  3:09 PM    * New onset seizure (HCC)  Assessment & Plan  4 episodes of seizure activity reported prior to arrival.  Previous history of having 2 episodes not investigated.  Mom positive for epilepsy.  COVID-positive  Had a fever of 102 when had seizure  Neurology consulted and following  Continue Keppra 750 mg p.o. twice daily  Neurology recommended patient to stay for an MRI of the brain and telemetry monitoring however patient is insistent on being discharged.  Patient expresses that he has a good relationship with his PCP and will follow-up for outpatient MRI  He plans to follow-up with neurology for outpatient EEG  I have completed a form for [employer]; patient is aware he should not drive        Transaminitis  Assessment & Plan  Suspect covid related no abdominal tenderness  Patient is aware he needs outpatient follow-up with PCP      COVID  Assessment & Plan  Chest x-ray reviewed; stable on room air; supportive care  Tylenol prn fevers      Medical Problems       Resolved Problems  Date Reviewed: 12/30/2023   None       Discharging Physician / Practitioner: Pratima Bright PA-C  PCP: No primary care provider on file.  Admission Date:   Admission Orders (From admission, onward)       Ordered        12/30/23 1731  INPATIENT ADMISSION  Once                          Discharge Date: 12/31/23    Consultations During Hospital Stay:  Neurology     Procedures Performed:   None    Significant Findings / Test Results:   None    Incidental Findings:   None    Test Results Pending at Discharge (will require follow up):   None     Outpatient Tests Requested:  Repeat liver function test with mild transaminitis during hospitalization  Outpatient MRI of the  "brain  Outpatient EEG    Complications: None    Reason for Admission: Seizure-like activity    Hospital Course:   Esteban Johansen is a 48 y.o. male patient who originally presented to the hospital on 12/30/2023 due to suspected seizure activity.  Patient notes cold symptoms for several days prior to arrival.  He admits to vomiting at home.  Following episode of retching, family member witnessed grand mal seizure-like activity for approximately 30 seconds.  Patient notes the symptoms reoccurred for total 4 times prior to presentation to the ER.  Patient tested positive for COVID in the ER.  Fortunately he is stable on room air.  No infiltrates were found on chest x-ray.  Patient notes prior seizure like activities during times of illness approximately twice prior to this.  Patient was seen by neurology.  Neurology recommended MRI of the brain and telemetry monitoring.  Patient was also recommended to have an outpatient EEG.  Patient is requesting discharge today as he has good family doctor follow-up.  He has not had any breakthrough seizures since admission to the hospital.  He is stable on Keppra.    The patient, initially admitted to the hospital as inpatient, was discharged earlier than expected given the following: Per patient's request to be discharged prior to complete workup.    Please see above list of diagnoses and related plan for additional information.     Condition at Discharge: fair    Discharge Day Visit / Exam:   Subjective: Patient notes he feels well.  He denies breakthrough seizure activity, chest pain, shortness of breath, nausea, vomiting, abdominal pain, or fever.  He has recently moved his bowels.  He denies dysuria.  Vitals: Blood Pressure: 119/73 (12/31/23 0729)  Pulse: 73 (12/31/23 0729)  Temperature: 97.9 °F (36.6 °C) (12/30/23 2051)  Temp Source: Temporal (12/30/23 2051)  Respirations: 16 (12/31/23 0342)  Height: 5' 8\" (172.7 cm) (12/30/23 1343)  Weight - Scale: 77.1 kg (170 lb) " (12/30/23 1343)  SpO2: 98 % (12/31/23 0729)  Exam:   Physical Exam  Constitutional:       Appearance: He is normal weight.   HENT:      Head: Normocephalic.      Right Ear: External ear normal.      Left Ear: External ear normal.      Nose: Nose normal.      Mouth/Throat:      Mouth: Mucous membranes are moist.      Pharynx: Oropharynx is clear.   Eyes:      Extraocular Movements: Extraocular movements intact.      Conjunctiva/sclera: Conjunctivae normal.   Cardiovascular:      Rate and Rhythm: Normal rate and regular rhythm.   Pulmonary:      Effort: Pulmonary effort is normal. No respiratory distress.      Breath sounds: No wheezing or rales.      Comments: Coarse but clear  Musculoskeletal:         General: Normal range of motion.      Cervical back: Normal range of motion.   Skin:     General: Skin is warm and dry.      Capillary Refill: Capillary refill takes less than 2 seconds.   Neurological:      General: No focal deficit present.      Mental Status: He is alert and oriented to person, place, and time. Mental status is at baseline.   Psychiatric:         Mood and Affect: Mood normal.         Thought Content: Thought content normal.         Judgment: Judgment normal.       Discussion with Family: Significant other updated at bedside    Discharge instructions/Information to patient and family:   See after visit summary for information provided to patient and family.      Provisions for Follow-Up Care:  See after visit summary for information related to follow-up care and any pertinent home health orders.      Mobility at time of Discharge:      HLM Goal achieved. Continue to encourage appropriate mobility.     Disposition:   Home    Planned Readmission: None     Discharge Statement:  I spent 60 minutes discharging the patient. This time was spent on the day of discharge. I had direct contact with the patient on the day of discharge. Greater than 50% of the total time was spent examining patient, answering all  patient questions, arranging and discussing plan of care with patient as well as directly providing post-discharge instructions.  Additional time then spent on discharge activities.    Discharge Medications:  See after visit summary for reconciled discharge medications provided to patient and/or family.      **Please Note: This note may have been constructed using a voice recognition system**

## 2023-12-31 NOTE — TELEMEDICINE
TeleConsultation - Neurology   Esteban Johansen 48 y.o. male MRN: [id number]  Unit/Bed#: ED 01 Encounter: [id number]        REQUIRED DOCUMENTATION:     1. This service was provided via Telemedicine.  2. Provider located at [facility].  3. TeleMed provider: Luis William MD.  4. Identify all parties in room with patient during tele consult:  Significant other  5.Patient was then informed that this was a Telemedicine visit and that the exam was being conducted confidentially over secure lines. My office door was closed. No one else was in the room.  Patient acknowledged consent and understanding of privacy and security of the Telemedicine visit, and gave us permission to have the assistant stay in the room in order to assist with the history and to conduct the exam.  I informed the patient that I have reviewed their record in Epic and presented the opportunity for them to ask any questions regarding the visit today.  The patient agreed to participate.             Assessment/Plan   Although seizure in the setting of infection is more likely, stronger suspicion for reduced seizure threshold at baseline that is further getting reduced with infection precipitating the seizure activity. Cannot fully rule out vasovagal induced convulsive syncope from the vomiting however with multiple events this appears less likely. Also mother has history of epilepsy which may suggest that patient may have some degree of reduced seizure threshold. Currently he doesn't seem encephalopathic. He has had an mri brain scan w/wo contrast done at [facility] in April of this year unremarkable. Nonfocal neurologic exam currently.      Keppra 750 mg po bid  Mri brain w/wo contrast  Eeg. If negative would still keep keppra on board and have an outpt 72 hour   tele  Outpt epilepsy team in 3-4 weeks with attending or advanced practitioner.  IF NOT ABLE TO COMPLETE WORKUP OVER NEXT DAY OR TWO CAN DO SO AS AN OUTPATIENT GIVEN THAT HE HAS HAD NO FURTHER  SEIZURES AND IS TOLERATING KEPPRA WELL.  HE WILL NEED PENDOT NOTIFIED FOR NO DRIVING. I HAVE EXPLAINED THIS TO PATIENT AND SIGNIFICANT OTHER.    History of Present Illness     Reason for Consult / Principal Problem: seizure like activty    HPI: Esteban Johansen is a 48 y.o.  male who presents with seizure yesterday evening 12/29 gtc lasting 30 seconds and another morning of 12/30 at home and third event in [facility] ER this afternoon. He has recently tested postive for covid.  Temp 102 F. He vomited with both seizures like events.  Pt foams at mouth and eyes rolled back. When he wakes up he asks what has happened. Similar episode in the past to prior illness. Pt believes his seizure like events are triggered by vomiting and high fevers. 1 gm keppra load in the ER.    His mother has epilepsy. He himself denies any history of head trauma including concussions. No CNS infections. No hx of blank stares, tongue bite, incontinence in sleep. No developmental abnormalities.    Inpatient consult to Neurology  Consult performed by: Luis William MD  Consult ordered by: Marie Ortiz MD           Review of Systems  Per 12 point review see hpi, dry cough, headache    Historical Information   History reviewed. No pertinent past medical history.  History reviewed. No pertinent surgical history.  Social History   Social History     Substance and Sexual Activity   Alcohol Use Not Currently     Social History     Substance and Sexual Activity   Drug Use Not Currently     E-Cigarette/Vaping    E-Cigarette Use Current Every Day User      E-Cigarette/Vaping Substances    Nicotine Yes     Flavoring Yes      Social History     Tobacco Use   Smoking Status Never   Smokeless Tobacco Never     Family History: non-contributory      Meds/Allergies   all current active meds have been reviewed    Allergies   Allergen Reactions    Morphine Hives     hives- fentanyl(tolerating);tammi.percocet       Objective   Vitals:Blood pressure 113/74,  "pulse 73, temperature 97.9 °F (36.6 °C), temperature source Temporal, resp. rate 17, height 5' 8\" (1.727 m), weight 77.1 kg (170 lb), SpO2 96%.,Body mass index is 25.85 kg/m².    Intake/Output Summary (Last 24 hours) at 12/30/2023 2157  Last data filed at 12/30/2023 1702  Gross per 24 hour   Intake 1000 ml   Output --   Net 1000 ml       Physical Exam  General: No acute distress  HEENT: atraumatic, normocephalic    Neurologic Exam  MS: Alert and orientedX3. No expressive/receptive aphasia.  CN 2-12 intact  Motor: no involuntary movements. No focal weakness. At least 3 power throughout ue/le bilat. Additional practitioner not present to assess individual muscles.  Sensory: light touch intact ue/le bilat  Coordination: finger to nose, heel to shin intact ue/le bilat            Lab Results: I have personally reviewed pertinent reports.    Imaging Studies: I have personally reviewed pertinent films in PACS  EKG, Pathology, and Other Studies: I have personally reviewed pertinent films in PACS    "

## 2023-12-31 NOTE — DISCHARGE INSTR - AVS FIRST PAGE
Please return to the emergency department with any concerning symptoms including chest pain, shortness of breath, nausea, vomiting, dizziness, or breakthrough seizures.    Please follow-up with your PCP as soon as possible.    You need an EEG and an outpatient MRI completed to soon as possible.    Please follow-up for repeat blood work to evaluate your liver function as an outpatient.    You are not allowed to drive!

## 2023-12-31 NOTE — UTILIZATION REVIEW
Initial Clinical Review    Admission: Date/Time/Statement:   Admission Orders (From admission, onward)       Ordered        12/30/23 1731  INPATIENT ADMISSION  Once                          Orders Placed This Encounter   Procedures    INPATIENT ADMISSION     Standing Status:   Standing     Number of Occurrences:   1     Order Specific Question:   Level of Care     Answer:   Med Surg [16]     Order Specific Question:   Estimated length of stay     Answer:   More than 2 Midnights     Order Specific Question:   Certification     Answer:   I certify that inpatient services are medically necessary for this patient for a duration of greater than two midnights. See H&P and MD Progress Notes for additional information about the patient's course of treatment.     ED Arrival Information       Expected   -    Arrival   12/30/2023 13:32    Acuity   Urgent              Means of arrival   Walk-In    Escorted by   Family Member    Service   Hospitalist    Admission type   Emergency              Arrival complaint   Covid positive, PT states he had 3 seizures since yesterday             Chief Complaint   Patient presents with    Seizure - New Onset     Pt reports testing + covid, states last night had a seizure while vomiting - family at bedside stating it was a grand mal seizure lasting approx 30 seconds - this AM had another seizure; each time seizure is occurring patient is vomiting; had similar episodes in the past       Initial Presentation: 48 y.o. male to ED from home w/ PMH of no significant past medical history who presents with being sick with a headache dry cough fevers on and off yesterday started to have a grand mal seizure with her eyes rolling back shaking and stiffness lasting about 30 seconds but wife took his fever was 102 that he started vomiting again and was about to have a seizure and he had a seizure grand mal afterwards again when he started vomiting and today at the waiting room. 4 episodes yest . COVID + .  Admitted IP status w/ new onset seizure . Loaded w/ keppra , check CT brain , lytes replete and monitor .     12/30 Neuro Consult   Although seizure in the setting of infection is more likely, stronger suspicion for reduced seizure threshold at baseline that is further getting reduced with infection precipitating the seizure activity. Cannot fully rule out vasovagal induced convulsive syncope from the vomiting however with multiple events this appears less likely . Given 1gm keppra in ED . Pt believes his seizures are triggered by vomiting and high fevers.     ED Triage Vitals [12/30/23 1343]   Temperature Pulse Respirations Blood Pressure SpO2   98 °F (36.7 °C) 88 18 121/83 98 %      Temp Source Heart Rate Source Patient Position - Orthostatic VS BP Location FiO2 (%)   Temporal Monitor Sitting Right arm --      Pain Score       No Pain          Wt Readings from Last 1 Encounters:   12/30/23 77.1 kg (170 lb)     Additional Vital Signs:   12/31/23 0729 -- 73 -- 119/73 91 98 % None (Room air) Lying   12/31/23 0700 -- 76 -- 124/86 100 92 % -- --   12/31/23 0342 -- 86 16 120/78 -- 98 % None (Room air) --   12/30/23 2051 97.9 °F (36.6 °C) 73 17 113/74 81 96 % None (Room air) Lying   12/30/23 1755 98 °F (36.7 °C) 75 16 109/74 85 96 % None (Room air) Lying   12/30/23 1530 98.2 °F (36.8 °C) 79 17 131/83 94 96 % None (Room air) Lying     Pertinent Labs/Diagnostic Test Results:   CT head wo contrast   Final Result by E. Alec Schoenberger, MD (12/31 0657)      No acute intracranial abnormality.                  Workstation performed: [id number]         XR chest 1 view portable   Final Result by Alessandro Miller MD (12/31 1215)      No acute cardiopulmonary disease.                  Workstation performed: [id number]         MRI inpatient order    (Results Pending)     Results from last 7 days   Lab Units 12/30/23  1348   SARS-COV-2  Positive*     Results from last 7 days   Lab Units 12/30/23  1348   WBC Thousand/uL 5.91    HEMOGLOBIN g/dL 15.3   HEMATOCRIT % 46.9   PLATELETS Thousands/uL 247   NEUTROS ABS Thousands/µL 4.40         Results from last 7 days   Lab Units 12/31/23  0617 12/30/23  1348   SODIUM mmol/L 135 136   POTASSIUM mmol/L 3.8 3.8   CHLORIDE mmol/L 103 101   CO2 mmol/L 26 24   ANION GAP mmol/L 6 11   BUN mg/dL 10 13   CREATININE mg/dL 0.90 1.00   EGFR ml/min/1.73sq m 99 87   CALCIUM mg/dL 8.2* 9.2     Results from last 7 days   Lab Units 12/31/23  0617 12/30/23  1348   AST U/L 46* 45*   ALT U/L 68* 71*   ALK PHOS U/L 89 94   TOTAL PROTEIN g/dL 6.3* 7.5   ALBUMIN g/dL 3.8 4.5   TOTAL BILIRUBIN mg/dL 0.68 1.24*         Results from last 7 days   Lab Units 12/31/23  0617 12/30/23  1348   GLUCOSE RANDOM mg/dL 121 133     Results from last 7 days   Lab Units 12/30/23  1539   LACTIC ACID mmol/L 2.1*         Results from last 7 days   Lab Units 12/30/23  1348   INFLUENZA A PCR  Negative   INFLUENZA B PCR  Negative   RSV PCR  Negative           ED Treatment:   Medication Administration from 12/30/2023 1331 to 12/31/2023 1339         Date/Time Order Dose Route Action     12/30/2023 1539 EST sodium chloride 0.9 % bolus 1,000 mL 1,000 mL Intravenous New Bag     12/30/2023 1545 EST ondansetron (ZOFRAN) injection 4 mg 4 mg Intravenous Given     12/30/2023 1545 EST ketorolac (TORADOL) injection 15 mg 15 mg Intravenous Given     12/30/2023 1620 EST acetaminophen (TYLENOL) tablet 650 mg 650 mg Oral Given     12/30/2023 1755 EST levETIRAcetam (KEPPRA) tablet 1,000 mg 1,000 mg Oral Given     12/31/2023 0621 EST multi-electrolyte (PLASMALYTE-A/ISOLYTE-S PH 7.4) IV solution 125 mL/hr Intravenous New Bag     12/30/2023 1755 EST multi-electrolyte (PLASMALYTE-A/ISOLYTE-S PH 7.4) IV solution 125 mL/hr Intravenous New Bag     12/31/2023 0341 EST acetaminophen (TYLENOL) tablet 650 mg 650 mg Oral Given     12/30/2023 2209 EST acetaminophen (TYLENOL) tablet 650 mg 650 mg Oral Given     12/31/2023 0848 EST levETIRAcetam (KEPPRA) tablet 750 mg  750 mg Oral Given     12/31/2023 0849 EST ibuprofen (MOTRIN) tablet 600 mg 600 mg Oral Given          History reviewed. No pertinent past medical history.  Present on Admission:   New onset seizure (HCC)   COVID   Transaminitis      Admitting Diagnosis: Seizure (HCC) [R56.9]  Age/Sex: 48 y.o. male  Admission Orders:  Scheduled Medications:  levETIRAcetam, 750 mg, Oral, Q12H RHYS      Continuous IV Infusions:  multi-electrolyte, 125 mL/hr, Intravenous, Continuous      PRN Meds:  acetaminophen, 650 mg, Oral, Q6H PRN  ondansetron, 4 mg, Intravenous, Q6H PRN    Act as tammi   Up and OOB   Seizure precautions  Tele  Cont pulse ox     IP CONSULT TO NEUROLOGY    Network Utilization Review Department  ATTENTION: Please call with any questions or concerns to [phone] and carefully listen to the prompts so that you are directed to the right person. All voicemails are confidential.   For Discharge needs, contact Care Management DC Support Team at [phone] opt. 2  Send all requests for admission clinical reviews, approved or denied determinations and any other requests to dedicated fax number below belonging to the campus where the patient is receiving treatment. List of dedicated fax numbers for the Facilities:  FACILITY NAME UR FAX NUMBER   ADMISSION DENIALS (Administrative/Medical Necessity) [phone]   DISCHARGE SUPPORT TEAM (NETWORK) [phone]   PARENT CHILD HEALTH (Maternity/NICU/Pediatrics) [phone]   [facility] [phone]   [facility] [phone]   [facility] [phone]   [facility] [phone]   [facility] [phone]   [facility] [phone]   [facility] [phone]   [facility] [phone]   [facility] [phone]   [facility]  [facility] [phone]   [facility] [phone]

## 2023-12-31 NOTE — UTILIZATION REVIEW
NOTIFICATION OF INPATIENT ADMISSION   AUTHORIZATION REQUEST   SERVICING FACILITY:   [place] [address]  Tax ID: [id number]  NPI: [id number] ATTENDING PROVIDER:  Attending Name and NPI#: Marie Ortiz Md [[id number]]  Address: [address] [place] [address]  Phone: [phone]   ADMISSION INFORMATION:  Place of Service: Inpatient [facility]  Place of Service Code: 21  Inpatient Admission Date/Time: 12/30/23  5:04 PM  Discharge Date/Time: No discharge date for patient encounter.  Admitting Diagnosis Code/Description:  Seizure (HCC) [R56.9]     UTILIZATION REVIEW CONTACT:  Emperatriz Rodrigues, Utilization   Network Utilization Review Department  Phone: [phone]  Fax [phone]  Email: [email or web address]  Contact for approvals/pending authorizations, clinical reviews, and discharge.     PHYSICIAN ADVISORY SERVICES:  Medical Necessity Denial & Mzzj-yd-Qozf Review  Phone: [phone]  Fax: [phone]  Email: [email or web address]     DISCHARGE SUPPORT TEAM:  For Patients Discharge Needs & Updates  Phone: [phone] opt. 2 Fax: [phone]  Email: [email or web address]

## 2023-12-31 NOTE — ASSESSMENT & PLAN NOTE
Suspect covid related no abdominal tenderness  Patient is aware he needs outpatient follow-up with PCP

## 2023-12-31 NOTE — ED NOTES
Care assumed by this RN. Patient reporting headache. Tylenol last administered at 0341, states it was ineffective. Will message provider to notify of the same.      Mary Anne Farfan RN  12/31/23 2610

## 2024-01-01 LAB
ATRIAL RATE: 82 BPM
P AXIS: 44 DEGREES
PR INTERVAL: 222 MS
QRS AXIS: 11 DEGREES
QRSD INTERVAL: 76 MS
QT INTERVAL: 340 MS
QTC INTERVAL: 397 MS
T WAVE AXIS: 28 DEGREES
VENTRICULAR RATE: 82 BPM

## 2024-01-01 PROCEDURE — 93010 ELECTROCARDIOGRAM REPORT: CPT | Performed by: STUDENT IN AN ORGANIZED HEALTH CARE EDUCATION/TRAINING PROGRAM

## 2024-01-02 ENCOUNTER — TELEPHONE (OUTPATIENT)
Dept: NEUROLOGY | Facility: CLINIC | Age: 49
End: 2024-01-02

## 2024-01-02 NOTE — TELEPHONE ENCOUNTER
SLGED/12/30-12/31/Seizure like Activity    Outpt epilepsy team in 3-4 weeks with attending or advanced practitioner.     HFU schedueld for 1/30/24 @ 1:15 with Anjelica norman [place]

## 2024-01-02 NOTE — UTILIZATION REVIEW
NOTIFICATION OF ADMISSION DISCHARGE   This is a Notification of Discharge from [facility]. Please be advised that this patient has been discharge from our facility. Below you will find the admission and discharge date and time including the patient’s disposition.   UTILIZATION REVIEW CONTACT:  Emperatriz Rodrigues  Utilization   Network Utilization Review Department  Phone: [phone] x carefully listen to the prompts. All voicemails are confidential.  Email: [email or web address]     ADMISSION INFORMATION  PRESENTATION DATE: 12/30/2023  3:09 PM  OBERVATION ADMISSION DATE:   INPATIENT ADMISSION DATE: 12/30/23  5:04 PM   DISCHARGE DATE: 12/31/2023  2:13 PM   DISPOSITION:Home/Self Care    Network Utilization Review Department  ATTENTION: Please call with any questions or concerns to [phone] and carefully listen to the prompts so that you are directed to the right person. All voicemails are confidential.   For Discharge needs, contact Care Management DC Support Team at [phone] opt. 2  Send all requests for admission clinical reviews, approved or denied determinations and any other requests to dedicated fax number below belonging to the campus where the patient is receiving treatment. List of dedicated fax numbers for the Facilities:  FACILITY NAME UR FAX NUMBER   ADMISSION DENIALS (Administrative/Medical Necessity) [phone]   DISCHARGE SUPPORT TEAM ([facility]) [phone]   PARENT CHILD HEALTH (Maternity/NICU/Pediatrics) [phone]   [facility] [phone]   [facility] [phone]   [facility] [phone]   [facility] [phone]   [facility] [phone]   [facility] [phone]   [facility] [phone]   [facility]  [phone]   [facility] [phone]   [facility] [phone]   [facility] [phone]

## 2024-02-09 ENCOUNTER — TELEPHONE (OUTPATIENT)
Dept: NEUROLOGY | Facility: CLINIC | Age: 49
End: 2024-02-09

## 2024-02-09 NOTE — TELEPHONE ENCOUNTER
Called and spoke to patient. Patient confirmed upcoming apt with Anjelica Ashford PA-C on 2/13/24 @ 10:30 am in the [place] [facility].

## 2024-02-26 ENCOUNTER — TELEMEDICINE (OUTPATIENT)
Dept: NEUROLOGY | Facility: CLINIC | Age: 49
End: 2024-02-26
Payer: COMMERCIAL

## 2024-02-26 VITALS — HEIGHT: 68 IN | BODY MASS INDEX: 25.46 KG/M2 | WEIGHT: 168 LBS

## 2024-02-26 DIAGNOSIS — R56.9 SEIZURE-LIKE ACTIVITY (HCC): ICD-10-CM

## 2024-02-26 PROCEDURE — 99214 OFFICE O/P EST MOD 30 MIN: CPT | Performed by: PHYSICIAN ASSISTANT

## 2024-02-26 NOTE — PROGRESS NOTES
Virtual Regular Visit    Verification of patient location:    Patient is located at Home in the following state in which I hold an active license [place]      Assessment:  Mr. Johansen is a 48 year old male who has had 2 lifetime events of seizure vs convulsive syncope (4/2016 and 12/2023), both in the setting of illness (Influenza A and COVID respectively), with high fever and vomiting (events occurred with vomiting).  In 2016 he had a brain MRI which showed non-specific white matter changes and a normal routine EEG.  He was given a diagnosis of convulsive vasovagal syncope at that time.  More recently, he was evaluated in the ED, no workup other than CTH performed, and was started on Keppra 750mg BID, as there was some discussion that he could possibly have an underlying seizure disorder and seizure threshold being lowered by illness/fever.  He does have a family history of epilepsy in his mother (although today he mentions she may have been in an accident prior to onset of seizures).  He has not had any events concerning for seizure in between 2016 and 2023.      We discussed the events in detail today.  Both events in the setting of illness, fever and occurring while vomiting with no prolonged post-ictal state.  Aside from mother having seizures starting around age 18 (although possible she has post-traumatic epilepsy based on his report of an accident prior to seizure onset rather than genetic form of epilepsy), he has no other seizure risk factors. I am more suspicious that these events were convulsive vasovagal syncope.  Could also consider provoked seizure.  He has never had any events that were unprovoked.    I am suggesting updating a brain MRI and EEG, as last done in 2016.  He is agreeable.    His license was suspended, as the recent events were reported to Ric in the hospital (2016 events were never reported, per patient).  We discussed that if his workup is normal, I can send in the seizure reporting  form to [facility], but ultimately, the decision to restore a license is up to [facility], and not the provider.  He understands.    Reviewed seizure safety and first aid.  He should contact us if there are any further events concerning for seizure.      Plan:  - MRI brain seizure protocol w/wo contrast  - Routine EEG  - continue to not drive at this time.  Can send in [facility] seizure reporting form based on results of testing  - call the office if any other events concerning for seizure  - follow up after testing or PRN      Problem List Items Addressed This Visit          Other    Seizure-like activity (HCC)    Relevant Orders    EEG Routine and awake    MRI brain seizure wo and w contrast            Reason for visit is   Chief Complaint   Patient presents with    Virtual Regular Visit          Encounter provider Anjelica Ashford PA-C    Provider located at [address] [place] [id number]      Recent Visits  No visits were found meeting these conditions.  Showing recent visits within past 7 days and meeting all other requirements  Today's Visits  Date Type Provider Dept   02/26/24 Telemedicine Anjelica Ashford PA-C  [facility]   Showing today's visits and meeting all other requirements  Future Appointments  No visits were found meeting these conditions.  Showing future appointments within next 150 days and meeting all other requirements       The patient was identified by name and date of birth. Esteban Toro Eleno was informed that this is a telemedicine visit and that the visit is being conducted through the Epic Embedded platform. He agrees to proceed..  My office door was closed. No one else was in the room.  He acknowledged consent and understanding of privacy and security of the video platform. The patient has agreed to participate and understands they can discontinue the visit at any time.    Patient is aware this is a billable  service.     Subjective    HPI   Esteban Johansen is a 48 y.o. male with no significant PMH, who presents today for a hospital follow up for witnessed seizure activity.    Patient presented to the ED on 12/30/23 for seizure activity. He had recently tested positive for COVID and had fever of 102F on 12/29, had not been feeling well prior to this.  On 12/29 while at home, he was vomiting and had a GTC seizure lasting 30 seconds.  Wife described foaming at the mouth, eyes rolled back.    He had another event at home on 12/30, decided to come to the ED.  While in the ED waiting room he had his 3rd seizure event (described as GTC lasting 30-45 seconds).  Notes state he wakes up after the events asking “what happened?”.  He has a history of similar events with illness in the past (2016 when positive for influenza and vomiting).  He reported his mother has a history of seizures.  He reported that he believes his seizure events are triggered by vomiting and high fever.  He was loaded with Keppra in the ED.  He was seen by neurology via telemedicine.  There was concern that he may have an underlying seizure disorder and seizure threshold being lowered by illness, however could not rule out convulsive vasovagal syncope from vomiting and high fever.  He was advised to start Keppra 750mg BID.  MRI and EEG advised initially, however note then states that he had a brain MRI in April of this year at [facility], therefore it was cancelled (the brain MRI was completed in April 2016) and EEG was deferred to outpatient setting.    Patient presents today on video.  He reports he has been doing well since the hospitalization.  No recurrent events.  He reports this happened in 2016 in the setting of Flu and vomiting at that time as well.  He says this has only ever happened when he is really sick and vomited, and thinks he had vasovagal syncope.  His mother has history of seizures starting around age 17-18 (his mother had him at age  "16).  He believes she may have been in an accident around that time and seizures started after.  He has 2 siblings, neither of which have ever had a seizure.  He denies seizures in childhood, including febrile seizures.  No developmental delay.  No history of brain infections, stroke, neurosurgical procedures.  No alcohol or drug use, no prior head injuries.  He denies any events in between 2016 and 2023 event.  No episodes of staring, decreased awareness, confusion, myoclonic jerking.      Prior History:  On chart review, in April 2016 patient presented to the hospital ([facility]) due to witnessed seizure-like activity at home.  Per notes, he had been on a business trip and had been awake for over 24hrs.  When he got home from the trip he went to sleep, awoke in the middle of the night feeling nauseated, went to the bathroom and began vomiting.  Wife heard him and noticed that he “passed out” for a minute.  He was noted to have 102F temp, took Tylenol.  He then went back to bed, woke up a few hours later, vomited again, and this time \"he threw his head back and began to have generalized shaking. His eyes rolled back in his head. This lasted about a minute or two”.  No prolonged post-ictal state.  Denied any prior events, but did report mother's history of seizures. He ended up testing positive for Influenza A in the hospital.  MRI brain completed demonstrated “A few small foci of increasedT2 signal intensity in the parietal white matter, compatible with areas of demyelination or gliosis”.  EEG was completed and normal.  Etiology felt to be convulsive syncope in the setting of Influenza A and high fever.  He was to follow up with neurology outpatient but never did.      AED/side effects/compliance:  None, patient stopped taking levetiracetam which was prescribed in the hospital     Event/Seizure semiology:  -LOC, eyes roll back, generalized shaking and stiffness, foaming at the mouth, no prolonged post-ictal state   "   Special Features  Status epilepticus: No  Self Injury Seizures: No  Precipitating Factors: vomiting and high fever.  Event in 2016 in the setting of Influenza A, event in 2023 in the setting of COVID     Epilepsy Risk Factors:  Abnormal pregnancy: No  Abnormal birth/: No  Abnormal Development: No  Febrile seizures, simple: No  Febrile seizures, complex: No  CNS infection: No  Cerebral palsy: No  Head injury (moderate/severe): No  CNS neoplasm: No  CNS malformation: No  Neurosurgical procedure: No  Stroke: No  Alcohol abuse: No  Drug abuse: No  Family history Sz/epilepsy: yes, mother      Prior AEDs:  Levetiracetam briefly in hospital 2023     Prior workup:    Imaging   [facility] brain w/wo contrast  Impression: No acute infarct, hemorrhage or mass. A few small foci of increased T2 signal intensity in the parietal white matter, compatible with areas of demyelination or gliosis. These are nonspecific findings which are not necessarily clinically significant.     2023 CTH wo  No acute intracranial abnormality     EEG  [facility] Routine EEG  Normal     Social History:  Lives with: wife   Occupation: manager at [employer]  Driving: not currently--license suspended due to recent events     Past psychiatric History:  None     History reviewed. No pertinent past medical history.    History reviewed. No pertinent surgical history.    No current outpatient medications on file.     No current facility-administered medications for this visit.        Allergies   Allergen Reactions    Morphine Hives     hives- fentanyl(tolerating);tammi.percocet       Review of Systems   Constitutional: Negative.    HENT: Negative.     Eyes: Negative.    Respiratory: Negative.     Cardiovascular: Negative.    Gastrointestinal: Negative.    Endocrine: Negative.    Genitourinary: Negative.    Musculoskeletal: Negative.    Skin: Negative.    Allergic/Immunologic: Negative.    Neurological: Negative.    Hematological:  "Negative.    Psychiatric/Behavioral: Negative.         Video Exam    Vitals:    02/26/24 0850   Weight: 76.2 kg (168 lb)   Height: 5' 8\" (1.727 m)       Physical Exam  Constitutional:       Appearance: Normal appearance.   Neurological:      Mental Status: He is alert.      Comments: Mental status: AO x 3, able to follow commands, no dysarthria or aphasia    CN 1: not tested  CN 2: not tested  CN 3, 4, 6: no noticeable palsy, EOMs intact  CN 5: not tested  CN 7: face symmetrical  CN 8: hearing intact to voice  CN 9: not tested  CN 10: not tested  CN 11: shoulder shrug symmetric   CN 12: tongue midline     Motor:  No abnormal movements or focal weakness appreciated     Coordination:  No dysmetria on FTN      Psychiatric:         Mood and Affect: Mood normal.         Behavior: Behavior normal.          Visit Time  Total Visit Duration: 25 minutes         "

## 2024-02-26 NOTE — PATIENT INSTRUCTIONS
Will order a routine EEG and brain MRI  Once those are back, if normal, I can send the seizure reporting form to [facility].  Please just check that the seizure reporting form is all they sent you  We can plan for as-needed follow up depending on results of testing

## 2024-03-05 ENCOUNTER — HOSPITAL ENCOUNTER (OUTPATIENT)
Dept: NEUROLOGY | Facility: CLINIC | Age: 49
Discharge: HOME | End: 2024-03-05
Payer: COMMERCIAL

## 2024-03-05 DIAGNOSIS — R56.9 SEIZURE-LIKE ACTIVITY (HCC): ICD-10-CM

## 2024-03-05 PROCEDURE — 95816 EEG AWAKE AND DROWSY: CPT

## 2024-03-05 PROCEDURE — 95816 EEG AWAKE AND DROWSY: CPT | Performed by: STUDENT IN AN ORGANIZED HEALTH CARE EDUCATION/TRAINING PROGRAM

## 2024-03-12 ENCOUNTER — HOSPITAL ENCOUNTER (OUTPATIENT)
Dept: RADIOLOGY | Age: 49
Discharge: HOME | End: 2024-03-12
Payer: COMMERCIAL

## 2024-03-12 DIAGNOSIS — R56.9 SEIZURE-LIKE ACTIVITY (HCC): ICD-10-CM

## 2024-03-12 PROCEDURE — 70553 MRI BRAIN STEM W/O & W/DYE: CPT

## 2024-03-12 PROCEDURE — A9585 GADOBUTROL INJECTION: HCPCS | Performed by: PHYSICIAN ASSISTANT

## 2024-03-12 RX ORDER — GADOBUTROL 604.72 MG/ML
8 INJECTION INTRAVENOUS
Status: COMPLETED | OUTPATIENT
Start: 2024-03-12 | End: 2024-03-12

## 2024-03-12 RX ADMIN — GADOBUTROL 8 ML: 604.72 INJECTION INTRAVENOUS at 11:21

## 2024-03-21 ENCOUNTER — TELEPHONE (OUTPATIENT)
Dept: NEUROLOGY | Facility: CLINIC | Age: 49
End: 2024-03-21

## 2024-03-21 NOTE — TELEPHONE ENCOUNTER
No focal structural abnormality seen.  Small, scattered white matter changes, likely chronic microangiopathy.  This was also mentioned on his MRI from 2016.  Unrelated to his brain, there is noted to be some mild sinus thickening.  If he is having any sinus issues (congestion etc), should f/u with PCP.

## 2024-03-21 NOTE — TELEPHONE ENCOUNTER
03-, 4:47:33 pm EDT    Patient left voicemail requesting call back regarding MRI results.     [phone].    Anjelica - anything you can advise on MRI?

## 2024-03-26 NOTE — TELEPHONE ENCOUNTER
Message left for patient advising of previous. Requested call back if additional forms needed other than seizure reporting form.

## 2024-03-26 NOTE — TELEPHONE ENCOUNTER
I can send in [employer] form for him, but ultimately the decision to restore a license is up to [employer] and not the provider.  Please confirm if all that was requested from [employer] was a seizure reporting form.

## 2024-03-26 NOTE — TELEPHONE ENCOUNTER
Spoke to patient regarding MRI results. Patient verbalized understanding.     Patient now asking if these negative results on all studies would allow him to get license reinstated. He stated that in the past you had mentioned this.

## 2024-04-04 NOTE — TELEPHONE ENCOUNTER
Received vm from 4/2 at 2:14pm- hi, marin forrester calling. can you please give me a call back at [phone]. As soon as possible. Thank you. Bayron.  -----------------------------------------  Please see stephanie's message below.    Can you complete seizure reporting form

## 2025-07-20 NOTE — ASSESSMENT & PLAN NOTE
4 episodes of seizure activity reported prior to arrival.  Previous history of having 2 episodes not investigated.  Mom positive for epilepsy.  COVID-positive  Had a fever of 102 when had seizure  Nephrology consulted and following  Continue Keppra 750 mg p.o. twice daily  Neurology recommended patient to stay for an MRI of the brain and telemetry monitoring however patient is insistent on being discharged.  Patient expresses that he has a good relationship with his PCP and will follow-up for outpatient MRI  He plans to follow-up with neurology for outpatient EEG  I have completed a form for [employer]; patient is aware he should not drive       Patient admitted to room 4134 from ED. Patient is alert and oriented x4, denies pain at this time;Patient is room air. IV in place and patent. Skin assessment completed with no open areas . Patient has been oriented to room and floor policies. Care plan reviewed with patient/ and family .Fall precautions in place at this time. Call light within reach bed low position, bed alarm on. Patient has been encouraged to call when ambulating. ongoing monitoring